# Patient Record
Sex: MALE | URBAN - METROPOLITAN AREA
[De-identification: names, ages, dates, MRNs, and addresses within clinical notes are randomized per-mention and may not be internally consistent; named-entity substitution may affect disease eponyms.]

---

## 2020-03-05 ENCOUNTER — OFFICE VISIT CONVERTED (OUTPATIENT)
Dept: CARDIOLOGY | Facility: CLINIC | Age: 35
End: 2020-03-05
Attending: INTERNAL MEDICINE

## 2021-02-24 ENCOUNTER — OFFICE VISIT CONVERTED (OUTPATIENT)
Dept: UROLOGY | Facility: CLINIC | Age: 36
End: 2021-02-24
Attending: NURSE PRACTITIONER

## 2021-02-24 ENCOUNTER — CONVERSION ENCOUNTER (OUTPATIENT)
Dept: UROLOGY | Facility: CLINIC | Age: 36
End: 2021-02-24

## 2021-02-24 LAB
BILIRUB UR QL STRIP: NEGATIVE
COLOR UR: YELLOW
CONV BACTERIA IN URINE MICRO: 0
CONV CALCIUM OXALATE CRYSTALS /HPF IN URINE SEDIMENT BY MICROSCOPY: 0
CONV CLARITY OF URINE: CLEAR
CONV PROTEIN IN URINE BY AUTOMATED TEST STRIP: NEGATIVE
CONV UROBILINOGEN IN URINE BY AUTOMATED TEST STRIP: NORMAL
GLUCOSE UR QL: NEGATIVE
HGB UR QL STRIP: NEGATIVE
KETONES UR QL STRIP: NEGATIVE
LEUKOCYTE ESTERASE UR QL STRIP: NEGATIVE
NITRITE UR QL STRIP: NEGATIVE
PH UR STRIP.AUTO: 6 [PH]
RBC #/AREA URNS HPF: 0 /[HPF]
RENAL EPI CELLS #/AREA URNS HPF: 0 /[HPF]
SP GR UR: 1.02
SQUAMOUS SPT QL MICRO: 0
WBC #/AREA URNS HPF: 0 /[HPF]

## 2021-03-23 ENCOUNTER — PROCEDURE VISIT CONVERTED (OUTPATIENT)
Dept: UROLOGY | Facility: CLINIC | Age: 36
End: 2021-03-23
Attending: UROLOGY

## 2021-04-27 ENCOUNTER — OFFICE VISIT CONVERTED (OUTPATIENT)
Dept: UROLOGY | Facility: CLINIC | Age: 36
End: 2021-04-27
Attending: UROLOGY

## 2021-05-10 NOTE — H&P
History and Physical      Patient Name: Mike Gastelum   Patient ID: [id number]   Sex: Male   Birthdate: Lesly 10, 1985    Primary Care Provider: GARFIELD BEAVERS   Referring Provider: GARFIELD BEAVERS    Visit Date: February 24, 2021    Provider: BABAR Jones   Location: [facility]   Location Address: [address] [place] [address], [address] [place] [address]  [id number]   Location Phone: [phone]          Chief Complaint  · I desire sterilization.      History Of Present Illness  The patient is a 35 year old male, who is a consultation from GARFIELD BEAVERS, for vasectomy counseling. He is  and has 3 children. Prior  surgeries have not been performed.   The patient is counselled regarding a no scalpel vasectomy. Key points are that it should be considered irreversible even though it can be reversed, there are risks including failure to achieve sterility, recanalization, bleeding, testicular swelling and/or pain, formation of a sperm granuloma and infection. Limitations of activity post-procedure were discussed and he understands that he is not sterile immediately following the procedure. He will need to bring a semen specimen back in about 6 weeks to confirm the abscence of sperm and needs to use contraception until then. Patient and spouse have discussed and wish not to have procedure performed. Understands that this is considered an irreversible procedure. Denies any bleeding disorders or urological problems.       Past Medical History  Asthma; Carpal tunnel syndrome         Past Surgical History  Finger Surgery; [place] Tooth Extraction         Allergy List  NO KNOWN DRUG ALLERGIES       Allergies Reconciled  Social History  Alcohol (Current some day); Tobacco (Former)         Review of Systems  · Constitutional  o Denies  o : fever, headache, chills  · Eyes  o Denies  o : eye pain, double vision, blurred vision  · HENT  o Denies  o : sinus problems, sore throat, ear  "infection  · Cardiovascular  o Denies  o : chest pain, high blood pressure, varicosities  · Respiratory  o Denies  o : shortness of breath, wheezing, frequent cough  · Gastrointestinal  o Denies  o : nausea, vomiting, heartburn, indigestion, abdominal pain  · Genitourinary  o Denies  o : urgency, frequency, urinary retention, painful urination  · Integument  o Denies  o : rash, itching, boils  · Neurologic  o Denies  o : tingling or numbness, tremors, dizzy spells  · Musculoskeletal  o Denies  o : joint pain, neck pain, back pain  · Endocrine  o Denies  o : cold intolerance, heat intolerance, tired, excessive thirst, sluggish  · Psychiatric  o Admits  o : feels satisfied with life  o Denies  o : severe depression, concerns with hurting themselves  · Heme-Lymph  o Denies  o : swollen glands, blood clotting problems  · Allergic-Immunologic  o Denies  o : sinus allergy symptoms, hay fever  · All Others Negative      Vitals  Date Time BP Position Site L\R Cuff Size HR RR TEMP (F) WT  HT  BMI kg/m2 BSA m2 O2 Sat FR L/min FiO2 HC       02/24/2021 09:18 /68 Sitting    57 - R  97.5 180lbs 0oz 5'  6\" 29.05 1.95             Physical Examination  · Constitutional  o Appearance  o : Well nourished, well developed patient in no acute distress. Ambulating without difficulty.  · Respiratory  o Respiratory Effort  o : breathing unlabored  o Inspection of Chest  o : normal appearance, no retractions  o Auscultation of Lungs  o : normal breath sounds throughout  · Cardiovascular  o Heart  o :   § Auscultation of Heart  § : regular rate and rhythm, no murmurs, gallops or rubs  · Genitourinary  o Bladder  o : no abnormalities  o Urethral Meatus  o : no abnormalities  o Scrotum and Scrotal Contents  o :   § Scrotum  § : no abnormalities  § Epididymides  § : no abnormalities  § Testes  § : no abnormalities  · Neurologic  o Mental Status Examination  o : grossly oriented to person, place and time  o Gait and Station  o : normal " gait, able to stand without difficulty  · Psychiatric  o Mood and Affect  o : mood normal, affect appropriate      Figure 1.0: Pain Rating Scale-Sabina         Results  · In-Office Procedures  o Lab procedure  § Automated dipstick urinalysis with microscopy (11394)   § Color Ur: Yellow   § Clarity Ur: Clear   § Glucose Ur Ql Strip: Negative   § Bilirub Ur Ql Strip: Negative   § Ketones Ur Ql Strip: Negative   § Sp Gr Ur Qn: 1.025   § Hgb Ur Ql Strip: Negative   § pH Ur-LsCnc: 6.0   § Prot Ur Ql Strip: Negative   § Urobilinogen Ur Strip-mCnc: 0.2 E.U./dL   § Nitrite Ur Ql Strip: Negative   § WBC Est Ur Ql Strip: Negative   § RBC UrnS Qn HPF: 0   § WBC UrnS Qn HPF: 0   § Bacteria UrnS Qn HPF: 0   § Crystals UrnS Qn HPF: 0   § Epithelial Cells (non renal): 0 /HPF  § Epithelial Cells (renal): 0       Assessment  · Desires sterilization     V25.2    Problems Reconciled  Plan  · Medications  o Medications have been Reconciled  o Transition of Care or Provider Policy  · Instructions  o The patient is to go immediately home and lie down flat on his back with an ice pack on the scrotum. He may shower in the morning but no immersion in water for 4 days. He is to avoid strenuous activity for 3 days and straddle activity for 3 weeks. He is reminded that he is not yet sterile and must use contraception until we confirm he no longer has sperm in a semen specimen to be brought to the office in 6 weeks. He is to call for problems.  o The patient will schedule a vasectomy if he desires to proceed.  o Handouts were provided     written consent obtained. patient voices understanding of all risks, benefits and alternatives. written and verbal information and instructions provided. answered all questions and concerns. schedule vasectomy per patient.             Electronically Signed by: BABAR Jones -Author on February 24, 2021 09:55:42 AM

## 2021-05-10 NOTE — PROCEDURES
Procedure Note      Patient Name: Miek Gastelum   Patient ID: [id number]   Sex: Male   Birthdate: Lesly 10, 1985    Primary Care Provider: GARFIELD BEAVERS   Referring Provider: GARFIELD BEAVERS    Visit Date: March 23, 2021    Provider: Anil Carbajal MD   [facility] [facility]   Location Address: [address] [place] [address], [address]  [id number]   Location Phone: [phone]          VASECTOMY PROCEDURE    PATIENT was prepped and drapped in the usual manner. 1% Xylocaine/Marcaine 50:50 ratio (2 x 10 cc syringes) was injected in the area of the incision. Incision was made midline of scrotum using Chinese technique and then carried through the skin and subcutaneous tissue. Right vas was identified and grasped with the Chinese grasper. A segment of the vas was then dissected free. A segement of the vas was then removed. The lumens of the vas were then cauterized. Any bleeders were cauterized.   The left vas was then identified. The left vas ws then grasped with the Chinese grasper. The left vas was dissected free of surrounding tissues. A segment of the left vas was then removed. The ends of the vas were then cauterized. Any bleeders were cauterized.   Pressure dressing was applied. Patient tolerated the procedure well without any immediate complications.           Assessment  · Sterilization     V25.2/Z30.2      Plan  · Orders  o Vasectomy (47031) - V25.2/Z30.2 - 03/23/2021  · Instructions  o Written consent was obtained and scanned into the patient's EMR prior to performance of the procedure today.  o Use scrotal support for the next 2-3 days, typically a jock strap.  o Use ice packs for 10 minutes every 2-3 hours as needed for scrotal discomfort.  o If you develop any fever (Temp 100 F), chills, nausea, vomiting, increased pain, swelling, drainage, or bleeding call my office immediately.  o Patient understands that he needs to use an alternate form of birth control until he  has 2 semen specimens that show no sperm present. He can expect to have some bruising and swelling for the next week. He may return to his normal activities after 48 hours.   o In about 6-8 weeks you will need to obtain a semen specimen on two separate occassions for Dr. Carbajal to microscopically exam to ensure that no sperm are present.  · Referrals  o ID: [id number] Date: 02/24/2021 Type: Inbound  Specialty: Urology            Electronically Signed by: Anil Carbajal MD -Author on March 23, 2021 02:53:40 PM

## 2021-05-11 NOTE — PROCEDURES
Procedure Note      Patient Name: Mike Gastelum   Patient ID: [id number]   Sex: Male   Birthdate: Lesly 10, 1985    Primary Care Provider: GARFIELD BEAVERS   Referring Provider: GARFIELD BEAVERS    Visit Date: April 27, 2021    Provider: Anil Carbajal MD   [place] [facility]   Location Address: [address] [place] [address], [address]  [id number]   Location Phone: [phone]          Patient presents to the office for semen analysis. He is 6-8 weeks status post vasectomy.   This is the patient's first semen specimen.   Semen Analysis under microscopic examination: Sperm NOT PRESENT           Assessment  · Sterilization     V25.2/Z30.2      Plan  · Orders  o Semen Analysis (Presence & Motility of Sperm) (17660) - -             Electronically Signed by: Zach Jin Other -Author on April 27, 2021 10:23:45 AM

## 2021-05-14 ENCOUNTER — OFFICE VISIT CONVERTED (OUTPATIENT)
Dept: UROLOGY | Facility: CLINIC | Age: 36
End: 2021-05-14
Attending: UROLOGY

## 2021-05-14 VITALS
HEIGHT: 66 IN | HEART RATE: 57 BPM | TEMPERATURE: 97.5 F | BODY MASS INDEX: 28.93 KG/M2 | WEIGHT: 180 LBS | SYSTOLIC BLOOD PRESSURE: 113 MMHG | DIASTOLIC BLOOD PRESSURE: 68 MMHG

## 2021-05-15 VITALS
BODY MASS INDEX: 30.22 KG/M2 | HEIGHT: 66 IN | DIASTOLIC BLOOD PRESSURE: 62 MMHG | WEIGHT: 188 LBS | SYSTOLIC BLOOD PRESSURE: 116 MMHG | HEART RATE: 56 BPM

## 2021-06-05 NOTE — PROCEDURES
Procedure Note      Patient Name: Mike Gastelum   Patient ID: [id number]   Sex: Male   Birthdate: Lesly 10, 1985    Primary Care Provider: GARFIELD BEAVERS   Referring Provider: GARFIELD BEAVERS    Visit Date: May 14, 2021    Provider: Anil Carbajal MD   Location: [facility]   Location Address: [address] [place] [address], [address]  [id number]   Location Phone: [phone]          Patient presents to the office for semen analysis. He is 6-8 weeks status post vasectomy.   This is the patient's second semen specimen.   Semen Analysis under microscopic examination: Sperm NOT PRESENT           Assessment  · Sterilization     V25.2/Z30.2      Plan  · Orders  o Semen Analysis (Presence & Motility of Sperm) (25398) - V25.2/Z30.2 - 05/14/2021            Electronically Signed by: NIDHI Sexton -Author on May 14, 2021 01:48:16 PM  Electronically Co-signed by: Anil Carbajal MD -Reviewer on May 14, 2021 05:05:03 PM